# Patient Record
Sex: FEMALE | Race: WHITE
[De-identification: names, ages, dates, MRNs, and addresses within clinical notes are randomized per-mention and may not be internally consistent; named-entity substitution may affect disease eponyms.]

---

## 2023-01-23 ENCOUNTER — TRANSCRIPTION ENCOUNTER (OUTPATIENT)
Age: 48
End: 2023-01-23

## 2023-12-27 PROBLEM — Z00.00 ENCOUNTER FOR PREVENTIVE HEALTH EXAMINATION: Status: ACTIVE | Noted: 2023-12-27

## 2024-01-08 ENCOUNTER — NON-APPOINTMENT (OUTPATIENT)
Age: 49
End: 2024-01-08

## 2024-01-08 ENCOUNTER — APPOINTMENT (OUTPATIENT)
Dept: NEUROLOGY | Facility: CLINIC | Age: 49
End: 2024-01-08
Payer: COMMERCIAL

## 2024-01-08 VITALS
WEIGHT: 110 LBS | DIASTOLIC BLOOD PRESSURE: 78 MMHG | BODY MASS INDEX: 20.24 KG/M2 | HEART RATE: 61 BPM | OXYGEN SATURATION: 98 % | SYSTOLIC BLOOD PRESSURE: 139 MMHG | HEIGHT: 62 IN

## 2024-01-08 DIAGNOSIS — Z86.79 PERSONAL HISTORY OF OTHER DISEASES OF THE CIRCULATORY SYSTEM: ICD-10-CM

## 2024-01-08 DIAGNOSIS — R51.9 HEADACHE, UNSPECIFIED: ICD-10-CM

## 2024-01-08 DIAGNOSIS — Z87.891 PERSONAL HISTORY OF NICOTINE DEPENDENCE: ICD-10-CM

## 2024-01-08 DIAGNOSIS — Z82.0 FAMILY HISTORY OF EPILEPSY AND OTHER DISEASES OF THE NERVOUS SYSTEM: ICD-10-CM

## 2024-01-08 DIAGNOSIS — R41.3 OTHER AMNESIA: ICD-10-CM

## 2024-01-08 PROCEDURE — 99205 OFFICE O/P NEW HI 60 MIN: CPT

## 2024-01-08 RX ORDER — FLUOXETINE HYDROCHLORIDE 10 MG/1
10 CAPSULE ORAL
Refills: 0 | Status: ACTIVE | COMMUNITY

## 2024-01-08 RX ORDER — LEVOTHYROXINE SODIUM 75 UG/1
75 TABLET ORAL
Refills: 0 | Status: ACTIVE | COMMUNITY

## 2024-01-08 NOTE — ASSESSMENT
[FreeTextEntry1] : 48 yr old female with prior history of RCVS presents for concerns about recent memory impairment  On MMSE she scored 27/30  She has family history of Alzheimer's disease  ETOH intake may play role in registration of recent information but now she is limiting ETOH intake to the weekend only Recommend Neurocognitive testing at this time.  She has had recent labwork with PCP which I will obtain for review  She is concerned about intermitent elevation in BP. I manually checked BP in my office and it was 120/70. If she is concerned about this then she should discuss with PCP if ambulatory BP monitoring can be ordered.  She denies any headaches on low dose Prozac so she can continue but any escalation in dosing then monitors for signs of RCVS.

## 2024-01-08 NOTE — PHYSICAL EXAM
[FreeTextEntry1] : Manual BP checked by me and BP noted to be 120/70 mm Hg.  Mental status: Orientation: Alert , oriented to month, day of week, year, location                                                                                                                                    Speech is fluent , able to name objects, repeat a sentence and write a sentence                                                                                                                                       Memory: Short term tested by registering  3 objects and recalled 1/3 in 5 min; Long term memory intact based on correct recall of past events and demographic details.                                                                                                                                                                                                     Concentration: 4/5 on serial calculations                                                                                             Comprehension : able follow 3 step requests                                                                                                                                                                                                                            Apraxia and visuospatial able to draw clock drawing and intersecting pentagon                                                                                                          Neglect is absent                                                                                                                                                                                                                                                                                  Agnosia is absent                                                                                                                                          MMSE 27/30 Cranial nerves: CN I deferred. CN  II VFF; fundus exam benign; III, IV, VI: PERRLA, EOM IV: Facial sensation normal B/L to touch, pinprick and temperatureVII:Facial strength normal B/L. VIII: Gross hearing intact IX, X: palate midline and elevates symmetrically XI: Trapezius normal strength, XII: Tongue midline without atrophy or fasciculation Motor: Muscle tone no rigidity or resistance in all 4 extremities. No atrophy or fasciculation. Muscle strength: arms and legs, proximal and distal flexors and extensors are normal 5/5 . No UE drift. Sensory: intact to pinprick, temperature  sensation to vibration and cold.  Reflexes: all present, normal, and symmetrical 2+  Coordination: finger to nose: normal. Heel to shin: normal Gait: steady normal based ; Romberg test negative

## 2024-01-08 NOTE — HISTORY OF PRESENT ILLNESS
[FreeTextEntry1] : 48 yr old female who was a prior patient of mine returns to reestablish care As for prevention she is willing to try Aimovig once a month monthly injection for headache prevention  She had seen me in the past for thrunderclap headaches attributed to RCVS. Follow CTA ahd shown resolution of vessel changes No clear cause identified at that time--  She was not on an SSRI at the time, no issues with BP , no smoking, no pregnancy  She now returns as she has concerns about her memory Her  and children repeatedly call her out on not remembering conversations or plans She also has noted at times that she has no clear recollection of events several people recall She will struggle with word finding at times and has to pause till she can finally say the word she is thinking  She had sustained multiple head injuries as a child due to repeated vasovagal events Her had has had Alzheimers for 10 years and now cannot care for his own ADL.  She would drink wine every night but since the new year now will only drink on weekends She would have 1-2 glasses but admits even with small amounts now she feels it affects here She sleeps well She has been limiting degree of exercise since RCVS diagnosis

## 2024-01-08 NOTE — CONSULT LETTER
[Dear  ___] : Dear  [unfilled], [( Thank you for referring [unfilled] for consultation for _____ )] : Thank you for referring [unfilled] for consultation for [unfilled] [Please see my note below.] : Please see my note below. [Sincerely,] : Sincerely, [FreeTextEntry3] : Mercy Atkins M.D. Stroke Director, [facility] Director of Neurology, [facility] Stroke Director [facility]

## 2024-01-08 NOTE — REVIEW OF SYSTEMS
[As Noted in HPI] : as noted in HPI [Difficulties in Speech] : speech difficulties [Fainting] : fainting [Fever] : no fever [Chills] : no chills [Feeling Poorly] : not feeling poorly [Feeling Tired] : not feeling tired [Recent Weight Gain (___ Lbs)] : no recent weight gain [Recent Weight Loss (___ Lbs)] : no recent weight loss [Confused or Disoriented] : no confusion [Memory Lapses or Loss] : no memory loss [Decr. Concentrating Ability] : no decrease in concentrating ability [Difficulty with Language] : no ~M difficulty with language [Changed Thought Patterns] : no change in thought patterns [Repeating Questions] : no repeated questioning about recent events [Facial Weakness] : no facial weakness [Arm Weakness] : no arm weakness [Hand Weakness] : no hand weakness [Leg Weakness] : no leg weakness [Poor Coordination] : good coordination [Difficulty Writing] : no difficulty writing [Numbness] : no numbness [Tingling] : no tingling [Abnormal Sensation] : no abnormal sensation [Hypersensitivity] : no hypersensitivity [Seizures] : no convulsions [Dizziness] : no dizziness [Lightheadedness] : no lightheadedness [Vertigo] : no vertigo [Cluster Headache] : no cluster headache [Migraine Headache] : no migraine headache [Tension Headache] : no tension-type headache [Difficulty Walking] : no difficulty walking [Inability to Walk] : able to walk [Ataxia] : no ataxia [Frequent Falls] : not falling [Limping] : not limping [Suicidal] : not suicidal [Anxiety] : no anxiety [Depression] : no depression [Change In Personality] : no personality change [Emotional Problems] : no emotional problems [Eye Pain] : no eye pain [Eyesight Problems] : no eyesight problems [Chest Pain] : no chest pain [Palpitations] : no palpitations

## 2024-01-10 ENCOUNTER — TRANSCRIPTION ENCOUNTER (OUTPATIENT)
Age: 49
End: 2024-01-10

## 2024-04-08 ENCOUNTER — APPOINTMENT (OUTPATIENT)
Dept: NEUROLOGY | Facility: CLINIC | Age: 49
End: 2024-04-08

## 2025-07-01 ENCOUNTER — APPOINTMENT (OUTPATIENT)
Dept: OBGYN | Facility: CLINIC | Age: 50
End: 2025-07-01

## 2025-07-01 PROCEDURE — 76830 TRANSVAGINAL US NON-OB: CPT | Mod: 26

## 2025-07-03 ENCOUNTER — TRANSCRIPTION ENCOUNTER (OUTPATIENT)
Age: 50
End: 2025-07-03

## 2025-08-14 ENCOUNTER — TRANSCRIPTION ENCOUNTER (OUTPATIENT)
Age: 50
End: 2025-08-14